# Patient Record
Sex: FEMALE | Race: BLACK OR AFRICAN AMERICAN | NOT HISPANIC OR LATINO | URBAN - METROPOLITAN AREA
[De-identification: names, ages, dates, MRNs, and addresses within clinical notes are randomized per-mention and may not be internally consistent; named-entity substitution may affect disease eponyms.]

---

## 2025-04-25 ENCOUNTER — OFFICE VISIT (OUTPATIENT)
Dept: URGENT CARE | Age: 20
End: 2025-04-25
Payer: COMMERCIAL

## 2025-04-25 VITALS
DIASTOLIC BLOOD PRESSURE: 83 MMHG | RESPIRATION RATE: 20 BRPM | OXYGEN SATURATION: 98 % | SYSTOLIC BLOOD PRESSURE: 132 MMHG | HEART RATE: 83 BPM | TEMPERATURE: 98.7 F

## 2025-04-25 DIAGNOSIS — T14.8XXA BLISTER: Primary | ICD-10-CM

## 2025-04-25 DIAGNOSIS — Z51.89 VISIT FOR WOUND CHECK: ICD-10-CM

## 2025-04-25 RX ORDER — MUPIROCIN 20 MG/G
OINTMENT TOPICAL 3 TIMES DAILY
Qty: 22 G | Refills: 0 | Status: SHIPPED | OUTPATIENT
Start: 2025-04-25 | End: 2025-05-05

## 2025-04-25 NOTE — PROGRESS NOTES
Subjective   Patient ID: Desirae Garcia is a 19 y.o. female. They present today with a chief complaint of No chief complaint on file..    History of Present Illness  Pt has BC implant to left upper arm. Would like checked for infection. States has had before but did not look like this. There is no redness; noted insertion site and bruising and small blister from bandage. No drainage from the site. No other associated s/s; no other concerns to address.           Past Medical History  Allergies as of 04/25/2025    (Not on File)       Prescriptions Prior to Admission[1]     Medical History[2]    Surgical History[3]         Review of Systems  Review of Systems  10 point ROS completed and all are negative other than what is stated in the current HPI                               Objective    There were no vitals filed for this visit.  No LMP recorded.    Physical Exam  Vitals and nursing note reviewed.   Constitutional:       Appearance: Normal appearance.   Cardiovascular:      Rate and Rhythm: Normal rate and regular rhythm.   Pulmonary:      Effort: Pulmonary effort is normal.      Breath sounds: Normal breath sounds.   Musculoskeletal:         General: Tenderness present.        Arms:    Skin:     General: Skin is warm and dry.      Capillary Refill: Capillary refill takes less than 2 seconds.      Findings: Bruising present.      Comments: Small blister to inner upper arm from BC implant cover adhesive   Neurological:      Mental Status: She is alert and oriented to person, place, and time.   Psychiatric:         Behavior: Behavior normal.         Procedures    Point of Care Test & Imaging Results from this visit  No results found for this visit on 04/25/25.   Imaging  No results found.    Cardiology, Vascular, and Other Imaging  No other imaging results found for the past 2 days      Diagnostic study results (if any) were reviewed by [facility].    Assessment/Plan   Allergies, medications, history, and  pertinent labs/EKGs/Imaging reviewed by Ligia Angeles, APRN-CNP.     Medical Decision Making  Blister:  - Secondary to adhesive from bandage after BC implant placed  - No drainage  - Use abx ointment as instructed  - Cover when swimming this weekend  - Advised on s/s to seek emergent care for    Orders and Diagnoses  There are no diagnoses linked to this encounter.    Medical Admin Record      Patient disposition: Home    Electronically signed by [facility]  1:31 PM           [1] (Not in a hospital admission)  [2] No past medical history on file.  [3] No past surgical history on file.

## 2025-04-25 NOTE — PATIENT INSTRUCTIONS
Blister:  - Secondary to adhesive from bandage after BC implant placed  - No drainage  - Use abx ointment as instructed  - Cover when swimming this weekend  - Advised on s/s to seek emergent care for